# Patient Record
Sex: FEMALE | Race: WHITE | NOT HISPANIC OR LATINO | URBAN - METROPOLITAN AREA
[De-identification: names, ages, dates, MRNs, and addresses within clinical notes are randomized per-mention and may not be internally consistent; named-entity substitution may affect disease eponyms.]

---

## 2017-07-28 NOTE — PATIENT DISCUSSION
On POD #1, the patient is doing well. Post-op cataract sheet was given and reviewed. Instructed to continue antibiotic drops for 7 days, steroid drops 2 times a day for 2 weeks, then 1 times a day for 2 weeks and NSAID drops for 4 weeks.  No swimming for 2 weeks. Contact immediately for decreased vision, red eye or pain. Advised against heavy lifting, prolonged bending, or straining. No make up/showers/water to the eye. Emergency phone numbers given for patient to call at any time.

## 2017-08-02 NOTE — PATIENT DISCUSSION
Advised patient vision will continue to improve but some limitations will exist because of pupil, i.e. hazy, glare, etc.

## 2017-12-13 NOTE — PATIENT DISCUSSION
Recommended lid scrubs; Ocusoft sample given.  Can also use baby shampoo and water or Thera Tears Foaming Lid Cleanser.

## 2020-08-14 PROBLEM — H10.013 ACUTE FOLLICULAR CONJUNCTIVITIS: Noted: 2020-08-14

## 2020-08-14 PROBLEM — H00.012 EXTERNAL HORDEOLUM: Noted: 2021-11-18

## 2020-08-14 PROBLEM — H52.4 PRESBYOPIA: Noted: 2020-10-01

## 2021-11-18 ENCOUNTER — PREPPED CHART (OUTPATIENT)
Dept: URBAN - METROPOLITAN AREA CLINIC 94 | Facility: CLINIC | Age: 76
End: 2021-11-18

## 2021-11-18 PROBLEM — H10.013 ACUTE FOLLICULAR CONJUNCTIVITIS: Noted: 2020-08-14

## 2021-11-18 PROBLEM — H00.012 EXTERNAL HORDEOLUM: Noted: 2021-11-18

## 2022-01-22 ENCOUNTER — EMERGENCY VISIT (OUTPATIENT)
Dept: URBAN - METROPOLITAN AREA CLINIC 35 | Facility: CLINIC | Age: 77
End: 2022-01-22

## 2022-01-22 DIAGNOSIS — H10.32: ICD-10-CM

## 2022-01-22 PROCEDURE — 92012 INTRM OPH EXAM EST PATIENT: CPT

## 2022-01-22 ASSESSMENT — VISUAL ACUITY
OS_PH: 20/30
OS_CC: 20/40-1
OD_CC: 20/20-2
OU_CC: 20/20

## 2022-01-22 ASSESSMENT — TONOMETRY
OD_IOP_MMHG: 17
OS_IOP_MMHG: 15

## 2022-09-26 NOTE — PATIENT DISCUSSION
-- DO NOT REPLY / DO NOT REPLY ALL --  -- Message is from [facility] [employer] ([facility]) --    Insurance would like to know if there is a way to circumvent having an appointment , for the patient to receive prior authorization for a pulse oximeter .    Caller Information       Type Contact Phone/Fax    09/26/2022 02:43 PM CDT Phone (Incoming) [place] [phone]     [employer]        Alternative phone number: none    Can a detailed message be left? No    Message Turnaround:     Is it Working Hours? Yes - Working Hours     [place]:    Please give this turnaround time to the caller:   \"This message will be sent to [state Provider's name]. The clinical team will fulfill your request as soon as they review your message.\"                 The patient feels that the cataract is significantly impacting daily activities and has elected cataract surgery. The risks, benefits, and alternatives to surgery were discussed. The patient elects to proceed with surgery.

## 2022-11-02 NOTE — PATIENT DISCUSSION
Patient understands condition, prognosis and need for follow up care. no abrasions, no jaundice, no lesions, no pruritis, and no rashes.

## 2023-01-13 ENCOUNTER — ESTABLISHED COMPREHENSIVE EXAM (OUTPATIENT)
Dept: URBAN - METROPOLITAN AREA CLINIC 94 | Facility: CLINIC | Age: 78
End: 2023-01-13

## 2023-01-13 DIAGNOSIS — H52.4: ICD-10-CM

## 2023-01-13 DIAGNOSIS — H53.8: ICD-10-CM

## 2023-01-13 PROCEDURE — 92014 COMPRE OPH EXAM EST PT 1/>: CPT

## 2023-01-13 PROCEDURE — 92310 CONTACT LENS FITTING OU: CPT

## 2023-01-13 PROCEDURE — 92015 DETERMINE REFRACTIVE STATE: CPT

## 2023-01-13 ASSESSMENT — VISUAL ACUITY
OD_CC: J5
OD_CC: 20/20
OS_CC: 20/80-1
OS_CC: J2-1

## 2023-01-13 ASSESSMENT — TONOMETRY
OD_IOP_MMHG: 15
OS_IOP_MMHG: 15

## 2023-08-31 ENCOUNTER — PROBLEM (OUTPATIENT)
Dept: URBAN - METROPOLITAN AREA CLINIC 94 | Facility: CLINIC | Age: 78
End: 2023-08-31

## 2023-08-31 DIAGNOSIS — H11.32: ICD-10-CM

## 2023-08-31 PROCEDURE — 99213 OFFICE O/P EST LOW 20 MIN: CPT

## 2023-08-31 ASSESSMENT — VISUAL ACUITY
OD_CC: 20/20
OS_CC: 20/50+2

## 2024-02-22 ENCOUNTER — EMERGENCY VISIT (OUTPATIENT)
Dept: URBAN - METROPOLITAN AREA CLINIC 47 | Facility: CLINIC | Age: 79
End: 2024-02-22

## 2024-02-22 DIAGNOSIS — H10.32: ICD-10-CM

## 2024-02-22 PROCEDURE — 99213 OFFICE O/P EST LOW 20 MIN: CPT

## 2024-02-22 RX ORDER — NEOMYCIN SULFATE, POLYMYXIN B SULFATE AND DEXAMETHASONE 3.5; 10000; 1 MG/ML; [USP'U]/ML; MG/ML: 1 SUSPENSION OPHTHALMIC

## 2024-02-22 ASSESSMENT — TONOMETRY
OS_IOP_MMHG: 08
OD_IOP_MMHG: 09

## 2024-02-22 ASSESSMENT — VISUAL ACUITY
OD_CC: 20/25+2
OS_CC: 20/20-1

## 2024-05-07 ENCOUNTER — ESTABLISHED COMPREHENSIVE EXAM (OUTPATIENT)
Dept: URBAN - METROPOLITAN AREA CLINIC 90 | Facility: CLINIC | Age: 79
End: 2024-05-07

## 2024-05-07 DIAGNOSIS — H04.123: ICD-10-CM

## 2024-05-07 PROCEDURE — 92012 INTRM OPH EXAM EST PATIENT: CPT

## 2024-05-07 PROCEDURE — 76514 ECHO EXAM OF EYE THICKNESS: CPT

## 2024-05-07 ASSESSMENT — VISUAL ACUITY
OS_CC: 20/20-2
OD_CC: 20/20

## 2024-05-07 ASSESSMENT — TONOMETRY
OS_IOP_MMHG: 15
OD_IOP_MMHG: 15

## 2024-05-07 ASSESSMENT — PACHYMETRY
OD_CT_UM: 527
OS_CT_UM: 534

## 2024-05-31 ENCOUNTER — CONTACT LENS REFIT (OUTPATIENT)
Dept: URBAN - METROPOLITAN AREA CLINIC 90 | Facility: CLINIC | Age: 79
End: 2024-05-31

## 2024-05-31 DIAGNOSIS — H52.4: ICD-10-CM

## 2024-05-31 PROCEDURE — 92310 CONTACT LENS FITTING OU: CPT

## 2024-05-31 ASSESSMENT — VISUAL ACUITY
OU_CC: 20/25-1
OU_CC: J1

## 2024-06-21 ENCOUNTER — CONTACT LENS - FOLLOW UP (OUTPATIENT)
Dept: URBAN - METROPOLITAN AREA CLINIC 90 | Facility: CLINIC | Age: 79
End: 2024-06-21

## 2024-06-21 DIAGNOSIS — H52.4: ICD-10-CM

## 2024-06-21 PROCEDURE — 92012 INTRM OPH EXAM EST PATIENT: CPT | Mod: NC

## 2024-06-21 ASSESSMENT — VISUAL ACUITY
OU_CC: J1+
OU_CC: 20/20

## 2024-07-02 ENCOUNTER — EMERGENCY ADD-ON (OUTPATIENT)
Dept: URBAN - METROPOLITAN AREA CLINIC 82 | Facility: CLINIC | Age: 79
End: 2024-07-02

## 2024-07-02 DIAGNOSIS — H00.15: ICD-10-CM

## 2024-07-02 DIAGNOSIS — J30.0: ICD-10-CM

## 2024-07-02 PROCEDURE — 92012 INTRM OPH EXAM EST PATIENT: CPT

## 2024-07-02 ASSESSMENT — VISUAL ACUITY
OD_CC: 20/20
OS_CC: 20/70

## 2024-07-02 ASSESSMENT — TONOMETRY
OD_IOP_MMHG: 13
OS_IOP_MMHG: 13

## 2024-08-02 ENCOUNTER — PROBLEM (OUTPATIENT)
Dept: URBAN - METROPOLITAN AREA CLINIC 82 | Facility: CLINIC | Age: 79
End: 2024-08-02

## 2024-08-02 DIAGNOSIS — H00.026: ICD-10-CM

## 2024-08-02 PROCEDURE — 92012 INTRM OPH EXAM EST PATIENT: CPT

## 2024-08-02 ASSESSMENT — VISUAL ACUITY
OS_CC: 20/25-1
OD_CC: 20/25

## 2025-07-10 ENCOUNTER — ESTABLISHED COMPREHENSIVE EXAM (OUTPATIENT)
Dept: URBAN - METROPOLITAN AREA CLINIC 94 | Facility: CLINIC | Age: 80
End: 2025-07-10

## 2025-07-10 DIAGNOSIS — H04.123: ICD-10-CM

## 2025-07-10 DIAGNOSIS — H52.4: ICD-10-CM

## 2025-07-10 PROCEDURE — 92012 INTRM OPH EXAM EST PATIENT: CPT | Mod: NC

## 2025-07-10 ASSESSMENT — VISUAL ACUITY
OS_CC: J1+
OD_CC: 20/25